# Patient Record
Sex: MALE
[De-identification: names, ages, dates, MRNs, and addresses within clinical notes are randomized per-mention and may not be internally consistent; named-entity substitution may affect disease eponyms.]

---

## 2019-06-14 PROBLEM — Z00.00 ENCOUNTER FOR PREVENTIVE HEALTH EXAMINATION: Status: ACTIVE | Noted: 2019-06-14

## 2019-06-21 ENCOUNTER — APPOINTMENT (OUTPATIENT)
Dept: CARDIOLOGY | Facility: CLINIC | Age: 24
End: 2019-06-21
Payer: OTHER GOVERNMENT

## 2019-06-21 ENCOUNTER — NON-APPOINTMENT (OUTPATIENT)
Age: 24
End: 2019-06-21

## 2019-06-21 VITALS
WEIGHT: 203 LBS | HEIGHT: 68 IN | SYSTOLIC BLOOD PRESSURE: 129 MMHG | BODY MASS INDEX: 30.77 KG/M2 | HEART RATE: 58 BPM | OXYGEN SATURATION: 98 % | DIASTOLIC BLOOD PRESSURE: 84 MMHG

## 2019-06-21 DIAGNOSIS — R55 SYNCOPE AND COLLAPSE: ICD-10-CM

## 2019-06-21 DIAGNOSIS — R00.2 PALPITATIONS: ICD-10-CM

## 2019-06-21 DIAGNOSIS — R07.9 CHEST PAIN, UNSPECIFIED: ICD-10-CM

## 2019-06-21 PROCEDURE — 93000 ELECTROCARDIOGRAM COMPLETE: CPT

## 2019-06-21 PROCEDURE — 99205 OFFICE O/P NEW HI 60 MIN: CPT

## 2019-06-21 NOTE — REASON FOR VISIT
[Initial Evaluation] : an initial evaluation of [Chest Pain] : chest pain [Palpitations] : palpitations [Syncope] : syncope

## 2019-06-21 NOTE — REVIEW OF SYSTEMS
[Blurry Vision] : blurred vision [Chest Pain] : chest pain [Palpitations] : palpitations [Dizziness] : dizziness [Negative] : Genitourinary [Fever] : no fever [Headache] : no headache [Recent Weight Gain (___ Lbs)] : no recent weight gain [Chills] : no chills [Recent Weight Loss (___ Lbs)] : no recent weight loss [Feeling Fatigued] : not feeling fatigued [Seeing Double (Diplopia)] : no diplopia [Shortness Of Breath] : no shortness of breath [Dyspnea on exertion] : not dyspnea during exertion [Joint Pain] : no joint pain [Muscle Cramps] : no muscle cramps [Depression] : no depression [Skin: A Rash] : no rash: [Anxiety] : no anxiety [Easy Bleeding] : no tendency for easy bleeding [Easy Bruising] : no tendency for easy bruising

## 2019-06-21 NOTE — PHYSICAL EXAM
[Normal Oral Mucosa] : normal oral mucosa [Normal Conjunctiva] : the conjunctiva exhibited no abnormalities [Normal Jugular Venous A Waves Present] : normal jugular venous A waves present [Normal Jugular Venous V Waves Present] : normal jugular venous V waves present [No Jugular Venous Guzman A Waves] : no jugular venous guzman A waves [Normal Rate] : normal [Normal S1] : normal S1 [Normal S2] : normal S2 [No Murmur] : no murmurs heard [2+] : left 2+ [No Abnormalities] : the abdominal aorta was not enlarged and no bruit was heard [No Pitting Edema] : no pitting edema present [Auscultation Breath Sounds / Voice Sounds] : lungs were clear to auscultation bilaterally [Respiration, Rhythm And Depth] : normal respiratory rhythm and effort [Exaggerated Use Of Accessory Muscles For Inspiration] : no accessory muscle use [Abdomen Tenderness] : non-tender [Abnormal Walk] : normal gait [Abdomen Soft] : soft [Bowel Sounds] : normal bowel sounds [Gait - Sufficient For Exercise Testing] : the gait was sufficient for exercise testing [Nail Clubbing] : no clubbing of the fingernails [Cyanosis, Localized] : no localized cyanosis [Skin Color & Pigmentation] : normal skin color and pigmentation [] : no rash [Skin Turgor] : normal skin turgor [Oriented To Time, Place, And Person] : oriented to person, place, and time [Impaired Insight] : insight and judgment were intact [No Anxiety] : not feeling anxious [General Appearance - Well Developed] : well developed [Well Groomed] : well groomed [General Appearance - Well Nourished] : well nourished [Normal Appearance] : normal appearance [General Appearance - In No Acute Distress] : no acute distress [No Deformities] : no deformities [Not Palpable] : not palpable [S3] : no S3 [S4] : no S4 [Right Carotid Bruit] : no bruit heard over the right carotid [Left Carotid Bruit] : no bruit heard over the left carotid [Right Femoral Bruit] : no bruit heard over the right femoral artery [Left Femoral Bruit] : no bruit heard over the left femoral artery

## 2019-06-21 NOTE — DISCUSSION/SUMMARY
[FreeTextEntry1] : This is a 24-year-old white male without risk factors who presents with symptoms of atypical chest pain, palpitation, and episodes of exertional syncope. The chest pain does not sound cardiac since it only lasts for 5-10 seconds, occurs randomly, is well localized, it feels like a knife.\par \par I am concerned that the patient could have underlying congenital heart disease and we'll start with an echocardiogram. At his age it would be unusual to have congenital coronary artery disease. He also will wear a 30 day event monitor to check for cardiac dysrhythmia. Since his symptoms occur with extreme exertion he will undergo a symptom limited stress test. We'll look for any abnormality of blood pressure, and rhythm. It is possible that he passes out because he does not cool down and with extreme exercise he is vasodilated and because he is in good physical condition his heart rate probably drops quickly.\par \par In the meantime he will avoid strenuous activity and when he does exercise he will cool down and he will keep himself well-hydrated.\par \par I would appreciate your sending copies of blood work from your office for my review.\par \par This was all discussed in detail with the patient and I answered his questions.

## 2019-06-21 NOTE — HISTORY OF PRESENT ILLNESS
[FreeTextEntry1] : I saw Kermit Oscar in the office today for cardiac evaluation he is a 24-year-old white male whose been in the [employer] for the past 6 years. He denies any history of hypertension, diabetes, smoking, or family history of heart disease.\par \par He has had episodes of exertional syncope for the past 6 or 7 years. When he does extreme physical exertion, he gets lightheaded. When he stops exercising he has episodes where he passes out. This does not happen unless he is exercising. Sometimes it happens when he is just warming up. He gets no symptoms of significant chest pain or palpitation when he exercises. In addition, he gets a sharp knifelike pain in his left anterior chest that occurs randomly. This lasts for about 5 or 10 seconds and is associated with the inability to take a deep breath. Following this episode he sometimes gets a rapid heartbeat and feels lightheaded. These episodes occur randomly and sometimes only once or twice a month. They have been going on for a few years. The final symptom is a racing heartbeat that he gets randomly. This feels like his heart is beating fast and irregular and occurs usually at rest. He has a slight blurred vision and feels lightheaded. This symptom lasts for 1 minute and then resolves. This can also happen every other week or once a month. This has been going on for at least 3-4 years.\par \par The patient is on no medications. Last year for a few months he was on caffeine pills to reduce weight. He only drinks minimal amount of alcohol and caffeine and does not use any drugs.\par \par A resting electrocardiogram demonstrates sinus rhythm and appears to be normal.

## 2019-07-02 ENCOUNTER — APPOINTMENT (OUTPATIENT)
Dept: CARDIOLOGY | Facility: CLINIC | Age: 24
End: 2019-07-02
Payer: OTHER GOVERNMENT

## 2019-07-02 PROCEDURE — 93306 TTE W/DOPPLER COMPLETE: CPT

## 2019-07-09 ENCOUNTER — APPOINTMENT (OUTPATIENT)
Dept: CARDIOLOGY | Facility: CLINIC | Age: 24
End: 2019-07-09
Payer: OTHER GOVERNMENT

## 2019-07-09 PROCEDURE — 93015 CV STRESS TEST SUPVJ I&R: CPT

## 2019-08-20 ENCOUNTER — APPOINTMENT (OUTPATIENT)
Dept: GASTROENTEROLOGY | Facility: CLINIC | Age: 24
End: 2019-08-20

## 2019-08-22 ENCOUNTER — APPOINTMENT (OUTPATIENT)
Age: 24
End: 2019-08-22
Payer: OTHER GOVERNMENT

## 2019-08-22 VITALS
HEART RATE: 75 BPM | DIASTOLIC BLOOD PRESSURE: 81 MMHG | HEIGHT: 68 IN | WEIGHT: 200 LBS | BODY MASS INDEX: 30.31 KG/M2 | SYSTOLIC BLOOD PRESSURE: 122 MMHG

## 2019-08-22 PROCEDURE — 99204 OFFICE O/P NEW MOD 45 MIN: CPT

## 2019-08-22 PROCEDURE — 73562 X-RAY EXAM OF KNEE 3: CPT | Mod: LT

## 2019-08-22 NOTE — ADDENDUM
[FreeTextEntry1] : Documented by Zeinab Paulino acting solely as a scribe for Dr. Torsten Monzon on this date 08/22/2019 .\par \par All medical record entries made by the Scribe were at my, Dr. Torsten Monzon, direction and personally dictated by me on 08/22/2019 . I have reviewed the chart and agree that the record accurately reflects my personal performance of the history, physical exam, assessment and plan. I have also personally directed, reviewed, and agreed with the chart.\par \par \par

## 2019-08-22 NOTE — HISTORY OF PRESENT ILLNESS
[de-identified] : 24 year old male presenting with left knee pain. The patient’s pain is described to be constant and localized. The patient states that the pain is dull, achy and stabbing in nature. The pain worsens with physical activities such as walking and long distance running. He is currently in active duty, but cannot attribute the pain to any specific injury, fall, or trauma. He is currently taking no pain medication. No other complaints at this time.\par \par \par

## 2019-08-22 NOTE — PHYSICAL EXAM
[de-identified] : General: Alert and oriented x3. In no acute distress. Pleasant in nature with a normal affect. No apparent respiratory distress.\par X Knee Exam\par Skin: Clean, dry, intact\par Inspection: No obvious malalignment, no masses, no swelling, no effusion\par Pulses: 2+ DP/PT pulses\par ROM:  Left Knee 0-130 degrees of flexion. No pain with deep knee flexion.\par Tenderness: No MJLT. +LJLT. +Central tenderness. No pain over the patella facets. No pain to the quadriceps mechanism.\par Stability: Stable to varus, valgus, lachman testing. Negative anterior/posterior drawer.\par Strength: 5/5 Q/H/TA/GS/EHL, no atrophy\par Neuro: In tact to light touch throughout, DTR's normal\par Additional tests: Negative McMurrays test, Negative patellar grind test.  [de-identified] : 3V of the left knee were ordered obtained and reviewed by me today, 08/22/2019 , revealed: no fracture. The joint space is well maintained. \par \par

## 2019-08-22 NOTE — DISCUSSION/SUMMARY
[de-identified] : Today I had a lengthy discussion with the patient regarding their left knee pain. I have addressed all the patient's concerns surrounding the pathology of their condition. At this time I could like to obtain advanced imaging of the patient's left knee. An MRI was ordered so I can find out more about the etiology of the patient's condition. The patient should follow up in the office after obtaining the MRI. The patient was also encouraged to take anti-inflammatories such as Motrin, Advil and Aleve to help with the inflammation and pain in the interim. The patient understood and verbally agreed to the treatment plan. All of their questions were answered and they were satisfied with the visit. The patient should call the office if they have any questions or experience worsening symptoms.\par \par \par

## 2019-08-24 ENCOUNTER — APPOINTMENT (OUTPATIENT)
Dept: MRI IMAGING | Facility: CLINIC | Age: 24
End: 2019-08-24

## 2019-09-03 ENCOUNTER — FORM ENCOUNTER (OUTPATIENT)
Age: 24
End: 2019-09-03

## 2019-09-03 ENCOUNTER — APPOINTMENT (OUTPATIENT)
Dept: MRI IMAGING | Facility: CLINIC | Age: 24
End: 2019-09-03
Payer: OTHER GOVERNMENT

## 2019-09-04 ENCOUNTER — APPOINTMENT (OUTPATIENT)
Dept: MRI IMAGING | Facility: CLINIC | Age: 24
End: 2019-09-04
Payer: OTHER GOVERNMENT

## 2019-09-04 ENCOUNTER — OUTPATIENT (OUTPATIENT)
Dept: OUTPATIENT SERVICES | Facility: HOSPITAL | Age: 24
LOS: 1 days | End: 2019-09-04
Payer: OTHER GOVERNMENT

## 2019-09-04 DIAGNOSIS — M25.562 PAIN IN LEFT KNEE: ICD-10-CM

## 2019-09-04 PROCEDURE — 73721 MRI JNT OF LWR EXTRE W/O DYE: CPT

## 2019-09-04 PROCEDURE — 73721 MRI JNT OF LWR EXTRE W/O DYE: CPT | Mod: 26,LT

## 2019-09-25 ENCOUNTER — APPOINTMENT (OUTPATIENT)
Dept: ORTHOPEDIC SURGERY | Facility: CLINIC | Age: 24
End: 2019-09-25
Payer: OTHER GOVERNMENT

## 2019-09-25 DIAGNOSIS — M23.92 UNSPECIFIED INTERNAL DERANGEMENT OF LEFT KNEE: ICD-10-CM

## 2019-09-25 DIAGNOSIS — M25.562 PAIN IN LEFT KNEE: ICD-10-CM

## 2019-09-25 PROCEDURE — 99214 OFFICE O/P EST MOD 30 MIN: CPT

## 2019-09-25 NOTE — DISCUSSION/SUMMARY
[de-identified] : Assessment: Continued left knee pain.\par \par Plan:\par At this point in time I gave him a Medrol Dosepak to take for the next week to help with the pain. He will continue physical therapy and specifically work on stretching before he runs. 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

## 2019-09-25 NOTE — HISTORY OF PRESENT ILLNESS
[de-identified] : Kermit presents to the office to review the MRI of his left knee. He continues to have 4/10 intermittent pain left knee. It hurts him more so when he runs. He has been doing outpatient physical therapy for the knee. He denies locking or catching of the left knee. He denies numbness and tingling going down the left lower extremity.

## 2019-09-25 NOTE — PHYSICAL EXAM
[de-identified] : Laterality: Left knee\par \par General: Alert and oriented x3.  In no acute distress.  Pleasant in nature with a normal affect.  No apparent respiratory distress. \par \par Erythema, Warmth, Rubor: Negative\par Swelling/Edema: Negative \par ROM: 0-130 degrees\par Karuna's Test: Negative \par Medial Joint Line TTP: Negative\par Lateral Joint Line TTP: Slightly tender on palpation\par Lachman Exam/Anterior Drawer/Pivot Shift Test: Negative \par MCL Pain: Negative\par LCL Pain: Negative\par Iliotibial Band Pain: Negative\par Patellofemoral Joint Pain: Negative\par Pes Anserinus TTP: Negative\par Homans Sign: Negative\par Neurovascularly: Intact with no sensory or motor deficits\par  [de-identified] : MRI left knee reviewed with the patient today showed no abnormalities within the knee.

## 2019-11-22 ENCOUNTER — APPOINTMENT (OUTPATIENT)
Dept: CARDIOLOGY | Facility: CLINIC | Age: 24
End: 2019-11-22

## 2019-11-25 ENCOUNTER — APPOINTMENT (OUTPATIENT)
Dept: ORTHOPEDIC SURGERY | Facility: CLINIC | Age: 24
End: 2019-11-25
Payer: OTHER GOVERNMENT

## 2019-12-23 ENCOUNTER — APPOINTMENT (OUTPATIENT)
Dept: ORTHOPEDIC SURGERY | Facility: CLINIC | Age: 24
End: 2019-12-23
Payer: OTHER GOVERNMENT

## 2019-12-23 VITALS
WEIGHT: 200 LBS | HEART RATE: 63 BPM | SYSTOLIC BLOOD PRESSURE: 129 MMHG | HEIGHT: 68 IN | BODY MASS INDEX: 30.31 KG/M2 | DIASTOLIC BLOOD PRESSURE: 80 MMHG

## 2019-12-23 DIAGNOSIS — M25.512 PAIN IN LEFT SHOULDER: ICD-10-CM

## 2019-12-23 DIAGNOSIS — S43.432A SUPERIOR GLENOID LABRUM LESION OF LEFT SHOULDER, INITIAL ENCOUNTER: ICD-10-CM

## 2019-12-23 PROCEDURE — 99214 OFFICE O/P EST MOD 30 MIN: CPT

## 2019-12-23 PROCEDURE — 73030 X-RAY EXAM OF SHOULDER: CPT | Mod: 50

## 2019-12-23 NOTE — HISTORY OF PRESENT ILLNESS
[6] : an average pain level of 6/10 [5] : a current pain level of 5/10 [None] : No relieving factors are noted [8] : a maximum pain level of 8/10 [Constant] : ~He/She~ states the symptoms seem to be constant [de-identified] : ISABELLE is a 24 year male who presents today with complaints of bilateral shoulder pain.  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  He was seen at the [facility] where pain medication and a HEP was provided.  There was no followup of this injury.  Currently, he has constant pain that is worsened with activity.  His pain is located to his anterior shoulder.  Both arms go numb occasionally.  Approximately 1.5 mos. ago his right shoulder began bothering him.  His pain is located to his posterior shoulder.  He denies loss of ROM.  \par  [de-identified] : Activity in general

## 2019-12-23 NOTE — DISCUSSION/SUMMARY
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  After thorough examination and review of the x-rays I believe his pain is likely secondary to a SLAP tear.  I do thorough discussion with the patient regarding the nature of his symptoms as well as all treatment options.  We discussed both operative and nonoperative management.  At this time given that he is failed conservative measures over the past 4 years of physical therapy oral anti-inflammatories and a home exercise program, I recommend an MRI arthrogram to rule out a SLAP tear.  I will call him with the results.  He agrees with the above plan and all questions were answered.

## 2019-12-23 NOTE — CONSULT LETTER
[Dear  ___] : Dear  [unfilled], [Consult Letter:] : I had the pleasure of evaluating your patient, [unfilled]. [Consult Closing:] : Thank you very much for allowing me to participate in the care of this patient.  If you have any questions, please do not hesitate to contact me. [Please see my note below.] : Please see my note below. [Sincerely,] : Sincerely, [FreeTextEntry2] : SANJUANITA ABDUL MD\par  [FreeTextEntry3] :   Hiram Daigle DO.\par Sports Medicine \par \par Orthopaedic Surgery\par [facility] @ [facility]\par [address] \par [address]\[place] [address]\par [address] \[address] \[place] [address]\par Tel [phone]\par Fax [phone]\par

## 2019-12-23 NOTE — PHYSICAL EXAM
[de-identified] :  4 views of the bilateral shoulder were performed today and available for me to review. They demonstrate no fracture or dislocation. [No] glenohumeral degenerative changes noted. [No] AC joint degenerative changes noted. No gross deformities noted. [de-identified] : Physical Exam:\par General: Well appearing, no acute distress, A&O\par Neurologic: A&Ox3, No focal deficits\par Head: NCAT without abrasions, lacerations, or ecchymosis to head, face, or scalp\par Eyes: No scleral icterus, no gross abnormalities\par Respiratory: Equal chest wall expansion bilaterally, no accessory muscle use\par Lymphatic: No lymphadenopathy palpated\par Skin: Warm and dry\par Psychiatric: Normal mood and affect\par \par Left Shoulder\par ·	Inspection/Palpation: no tenderness, swelling or deformities\par ·	Range of Motion: no crepitus with ROM; Active ; ER at side 35; IR to L4; Passive ; ER at side 45; IR to L4\par ·	Strength: forward elevation in scapular plane [4/5], internal rotation [4/5], external rotation [4/5], adduction [4/5] and abduction [4/5]\par ·	Stability: no joint instability on provocative testing\par ·	Tests: Mukherjee test positive, Neer positive, positive drop arm test secondary to pain, bear hug test positive, Napolean sign negative, cross arm adduction negative, lift off sign positive, hornblowers sign negative, speeds test positive Yergason's test positive no bicipital groove tenderness, Caballero's Active Compression test positive Whipple test negative, bicep's load II test positive\par \par Right Shoulder\par ·	Inspection/Palpation: no tenderness, swelling or deformities\par ·	Range of Motion: full and painless in all planes, no crepitus\par ·	Strength: forward elevation in scapular plane 5/5, internal rotation 5/5, external rotation 5/5, adduction 5/5 and abduction 5/5\par ·	Stability: no joint instability on provocative testing\par ·	Tests: Mukherjee test negative, Neer sign negative, negative drop arm test secondary to pain, bear hug test negative, Napolean sign negative, cross arm adduction negative, lift off sign positive, hornblowers sign negative, speeds test negative, Yergason's test negative, no bicipital groove tenderness, Caballero's Active Compression test negative

## 2019-12-24 ENCOUNTER — APPOINTMENT (OUTPATIENT)
Dept: PULMONOLOGY | Facility: CLINIC | Age: 24
End: 2019-12-24
Payer: OTHER GOVERNMENT

## 2019-12-24 VITALS
RESPIRATION RATE: 16 BRPM | OXYGEN SATURATION: 98 % | HEIGHT: 68 IN | BODY MASS INDEX: 32.43 KG/M2 | HEART RATE: 68 BPM | DIASTOLIC BLOOD PRESSURE: 80 MMHG | SYSTOLIC BLOOD PRESSURE: 122 MMHG | WEIGHT: 214 LBS

## 2019-12-24 DIAGNOSIS — G47.19 OTHER HYPERSOMNIA: ICD-10-CM

## 2019-12-24 DIAGNOSIS — G47.33 OBSTRUCTIVE SLEEP APNEA (ADULT) (PEDIATRIC): ICD-10-CM

## 2019-12-24 PROCEDURE — 99204 OFFICE O/P NEW MOD 45 MIN: CPT

## 2019-12-24 RX ORDER — METHYLPREDNISOLONE 4 MG/1
4 TABLET ORAL
Qty: 1 | Refills: 1 | Status: DISCONTINUED | COMMUNITY
Start: 2019-09-26 | End: 2019-12-24

## 2019-12-24 NOTE — PHYSICAL EXAM
[Normal Appearance] : normal appearance [Well Groomed] : well groomed [General Appearance - Well Developed] : well developed [No Deformities] : no deformities [General Appearance - In No Acute Distress] : no acute distress [General Appearance - Well Nourished] : well nourished [Normal Conjunctiva] : the conjunctiva exhibited no abnormalities [II] : II [Eyelids - No Xanthelasma] : the eyelids demonstrated no xanthelasmas [Neck Circumference: ___] : neck circumference is [unfilled] [Heart Rate And Rhythm] : heart rate and rhythm were normal [FreeTextEntry1] : lateral ridging to tongue. [Heart Sounds] : normal S1 and S2 [Murmurs] : no murmurs present [Auscultation Breath Sounds / Voice Sounds] : lungs were clear to auscultation bilaterally [Exaggerated Use Of Accessory Muscles For Inspiration] : no accessory muscle use [Respiration, Rhythm And Depth] : normal respiratory rhythm and effort [Abdomen Soft] : soft [Abdomen Tenderness] : non-tender [Abdomen Mass (___ Cm)] : no abdominal mass palpated [Abnormal Walk] : normal gait [Nail Clubbing] : no clubbing of the fingernails [Gait - Sufficient For Exercise Testing] : the gait was sufficient for exercise testing [Cyanosis, Localized] : no localized cyanosis [Petechial Hemorrhages (___cm)] : no petechial hemorrhages [Skin Color & Pigmentation] : normal skin color and pigmentation [Deep Tendon Reflexes (DTR)] : deep tendon reflexes were 2+ and symmetric [Sensation] : the sensory exam was normal to light touch and pinprick [] : no rash [Skin Turgor] : normal skin turgor [Oriented To Time, Place, And Person] : oriented to person, place, and time [No Focal Deficits] : no focal deficits [Impaired Insight] : insight and judgment were intact [Affect] : the affect was normal

## 2019-12-24 NOTE — HISTORY OF PRESENT ILLNESS
[FreeTextEntry1] : The patient is a 24-year-old male who has issues with syncope. They occurred with blood drawing and also sometimes after exercise at random. When he passes out he was noted not to be breathing. Additionally he's been noted to have apneas during sleep with mild snoring. The patient reports excessive daytime sleepiness. [place] sleepiness score is 8/24. He reports that when he passes out he dreams. General he gets into bed at 9 PM and falls asleep immediately. Has some orthopedic issues and does wake up with pain at times. He is up at 4 AM feeling unrefreshed but without headaches. During the day he is in the  and does not nap. He has noted hypnagogue's, denies sleep paralysis and denies cataplectic attacks. He denies shortness of breath.\par \par He reports that sleepiness began about 4 years ago.He had a cardiac workup including stress test, echocardiogram, and long-term cardiac monitoring, all of which were unremarkable.

## 2019-12-24 NOTE — CONSULT LETTER
[Dear  ___] : Dear  [unfilled], [Consult Letter:] : I had the pleasure of evaluating your patient, [unfilled]. [Please see my note below.] : Please see my note below. [FreeTextEntry3] : Gloria Bellamy MD FCCP\par D-ABSM\par [employer]IM board certified in  Pulmonary diseases, Sleep medicine\par Internal medicine\par  [Consult Closing:] : Thank you very much for allowing me to participate in the care of this patient.  If you have any questions, please do not hesitate to contact me. [Sincerely,] : Sincerely,

## 2019-12-24 NOTE — HISTORY OF PRESENT ILLNESS
[FreeTextEntry1] : The patient is a 24-year-old male who has issues with syncope. They occurred with blood drawing and also sometimes after exercise at random. When he passes out he was noted not to be breathing. Additionally he's been noted to have apneas during sleep with mild snoring. The patient reports excessive daytime sleepiness. [place] sleepiness score is 8/24. He reports that when he passes out he dreams. General he gets into bed at 9 PM and falls asleep immediately. Has some orthopedic issues and does wake up with pain at times. He is up at 4 AM feeling unrefreshed but without headaches. During the day he is in the  and does not nap. He has noted hypnagogue's, denies sleep paralysis and denies cataplectic attacks. He denies shortness of breath.\par \par He reports that sleepiness began about 4 years ago.He had a cardiac workup including stress test, echocardiogram, and long-term cardiac monitoring, all of which were unremarkable.

## 2019-12-24 NOTE — REASON FOR VISIT
[Hypersomnolence] : hypersomnolence  [Consultation] : a consultation visit [Sleep Apnea] : sleep apnea

## 2019-12-24 NOTE — CONSULT LETTER
[Dear  ___] : Dear  [unfilled], [Consult Letter:] : I had the pleasure of evaluating your patient, [unfilled]. [Please see my note below.] : Please see my note below. [Consult Closing:] : Thank you very much for allowing me to participate in the care of this patient.  If you have any questions, please do not hesitate to contact me. [Sincerely,] : Sincerely, [FreeTextEntry3] : Gloria Bellamy MD FCCP\par D-ABSM\par ABIM board certified in  Pulmonary diseases, Sleep medicine\par Internal medicine\par

## 2019-12-24 NOTE — ASSESSMENT
[FreeTextEntry1] : The patient has hypersomnolence with some witnessed apneas, but syncopal attacks as well. Most likely this represents obstructive sleep apnea and vasovagal syncope, but possibility of narcolepsy with cataplexy is noted as well. Polysomnogram followed by multiple sleep latency testing has been ordered. I will see him back after that.

## 2019-12-24 NOTE — PHYSICAL EXAM
[General Appearance - Well Developed] : well developed [Normal Appearance] : normal appearance [Well Groomed] : well groomed [General Appearance - Well Nourished] : well nourished [No Deformities] : no deformities [General Appearance - In No Acute Distress] : no acute distress [II] : II [Normal Conjunctiva] : the conjunctiva exhibited no abnormalities [Eyelids - No Xanthelasma] : the eyelids demonstrated no xanthelasmas [FreeTextEntry1] : lateral ridging to tongue. [Neck Circumference: ___] : neck circumference is [unfilled] [Heart Rate And Rhythm] : heart rate and rhythm were normal [Heart Sounds] : normal S1 and S2 [Murmurs] : no murmurs present [Auscultation Breath Sounds / Voice Sounds] : lungs were clear to auscultation bilaterally [Exaggerated Use Of Accessory Muscles For Inspiration] : no accessory muscle use [Respiration, Rhythm And Depth] : normal respiratory rhythm and effort [Abdomen Tenderness] : non-tender [Abdomen Soft] : soft [Abdomen Mass (___ Cm)] : no abdominal mass palpated [Abnormal Walk] : normal gait [Gait - Sufficient For Exercise Testing] : the gait was sufficient for exercise testing [Cyanosis, Localized] : no localized cyanosis [Nail Clubbing] : no clubbing of the fingernails [Skin Color & Pigmentation] : normal skin color and pigmentation [Petechial Hemorrhages (___cm)] : no petechial hemorrhages [Skin Turgor] : normal skin turgor [Deep Tendon Reflexes (DTR)] : deep tendon reflexes were 2+ and symmetric [Sensation] : the sensory exam was normal to light touch and pinprick [] : no rash [No Focal Deficits] : no focal deficits [Impaired Insight] : insight and judgment were intact [Oriented To Time, Place, And Person] : oriented to person, place, and time [Affect] : the affect was normal

## 2020-01-29 ENCOUNTER — APPOINTMENT (OUTPATIENT)
Dept: ORTHOPEDIC SURGERY | Facility: CLINIC | Age: 25
End: 2020-01-29